# Patient Record
Sex: MALE | Race: WHITE | NOT HISPANIC OR LATINO | Employment: OTHER | URBAN - METROPOLITAN AREA
[De-identification: names, ages, dates, MRNs, and addresses within clinical notes are randomized per-mention and may not be internally consistent; named-entity substitution may affect disease eponyms.]

---

## 2017-02-02 ENCOUNTER — TELEPHONE (OUTPATIENT)
Dept: FAMILY MEDICINE | Facility: CLINIC | Age: 72
End: 2017-02-02

## 2017-02-02 NOTE — TELEPHONE ENCOUNTER
----- Message from Arian Valdez sent at 2/2/2017  1:45 PM CST -----  Contact: patient's daughter Tessie  Patient's daughter Tessie returning call. Please call back at [phone]. Thanks,

## 2017-02-02 NOTE — TELEPHONE ENCOUNTER
----- Message from Donnell West sent at 2/2/2017  9:48 AM CST -----  Contact: pt's daughter Tessie  Pt's daughter is requesting a callback  Call Back#[phone]  Thanks

## 2017-02-02 NOTE — TELEPHONE ENCOUNTER
Called pt daughter [place], she stated her father passed away this past October. And they are requesting that Dr. Belle type up a brief description of mental state of pt for the past year. She stated pt made a will last July leaving everything to the much younger girl friend. The family is trying to fight it, because they don't feel he ws in his right mind. Please advise.

## 2017-02-03 NOTE — TELEPHONE ENCOUNTER
I am not sure I can write this letter.  Not sure the legalities of patient confidentiality.  They may have to go through their attorneys for us to discuss these matters.

## 2017-02-03 NOTE — TELEPHONE ENCOUNTER
Spoke to patient's daughter (w[place]) and stated to her what Dr. Belle noted. Verbalized understanding.